# Patient Record
Sex: FEMALE | Race: WHITE | NOT HISPANIC OR LATINO | Employment: OTHER | URBAN - METROPOLITAN AREA
[De-identification: names, ages, dates, MRNs, and addresses within clinical notes are randomized per-mention and may not be internally consistent; named-entity substitution may affect disease eponyms.]

---

## 2021-03-17 ENCOUNTER — PREP FOR SURGERY (OUTPATIENT)
Dept: OTHER | Facility: HOSPITAL | Age: 71
End: 2021-03-17

## 2021-03-17 DIAGNOSIS — Z86.010 HISTORY OF COLON POLYPS: Primary | ICD-10-CM

## 2021-03-18 PROBLEM — Z86.0100 HISTORY OF COLON POLYPS: Status: ACTIVE | Noted: 2021-03-18

## 2021-03-18 PROBLEM — Z86.010 HISTORY OF COLON POLYPS: Status: ACTIVE | Noted: 2021-03-18

## 2021-06-22 PROBLEM — E78.5 HYPERLIPIDEMIA: Status: ACTIVE | Noted: 2021-06-22

## 2021-06-22 PROBLEM — I10 HYPERTENSION: Status: ACTIVE | Noted: 2021-06-22

## 2021-06-22 RX ORDER — LISINOPRIL AND HYDROCHLOROTHIAZIDE 20; 12.5 MG/1; MG/1
1 TABLET ORAL DAILY
COMMUNITY
Start: 2021-01-15

## 2021-06-22 RX ORDER — DIPHENOXYLATE HYDROCHLORIDE AND ATROPINE SULFATE 2.5; .025 MG/1; MG/1
1 TABLET ORAL DAILY
COMMUNITY

## 2021-06-22 RX ORDER — CHLORAL HYDRATE 500 MG
CAPSULE ORAL
Status: ON HOLD | COMMUNITY
End: 2021-06-23

## 2021-06-22 RX ORDER — SODIUM PHOSPHATE,MONO-DIBASIC 19G-7G/118
ENEMA (ML) RECTAL
COMMUNITY

## 2021-06-23 ENCOUNTER — HOSPITAL ENCOUNTER (OUTPATIENT)
Facility: HOSPITAL | Age: 71
Setting detail: HOSPITAL OUTPATIENT SURGERY
Discharge: HOME | End: 2021-06-23
Attending: COLON & RECTAL SURGERY | Admitting: COLON & RECTAL SURGERY

## 2021-06-23 ENCOUNTER — ANESTHESIA EVENT (OUTPATIENT)
Dept: GASTROENTEROLOGY | Facility: HOSPITAL | Age: 71
End: 2021-06-23

## 2021-06-23 ENCOUNTER — ANESTHESIA (OUTPATIENT)
Dept: GASTROENTEROLOGY | Facility: HOSPITAL | Age: 71
End: 2021-06-23

## 2021-06-23 VITALS
DIASTOLIC BLOOD PRESSURE: 54 MMHG | BODY MASS INDEX: 26.63 KG/M2 | HEIGHT: 62 IN | TEMPERATURE: 97.8 F | RESPIRATION RATE: 16 BRPM | WEIGHT: 144.7 LBS | HEART RATE: 68 BPM | SYSTOLIC BLOOD PRESSURE: 104 MMHG | OXYGEN SATURATION: 98 %

## 2021-06-23 DIAGNOSIS — Z86.010 HISTORY OF COLON POLYPS: ICD-10-CM

## 2021-06-23 PROCEDURE — 25010000002 PROPOFOL 10 MG/ML EMULSION: Performed by: NURSE ANESTHETIST, CERTIFIED REGISTERED

## 2021-06-23 PROCEDURE — 45380 COLONOSCOPY AND BIOPSY: CPT | Performed by: COLON & RECTAL SURGERY

## 2021-06-23 PROCEDURE — 25010000002 PHENYLEPHRINE PER 1 ML: Performed by: NURSE ANESTHETIST, CERTIFIED REGISTERED

## 2021-06-23 PROCEDURE — 88305 TISSUE EXAM BY PATHOLOGIST: CPT | Performed by: COLON & RECTAL SURGERY

## 2021-06-23 RX ORDER — SODIUM CHLORIDE, SODIUM LACTATE, POTASSIUM CHLORIDE, CALCIUM CHLORIDE 600; 310; 30; 20 MG/100ML; MG/100ML; MG/100ML; MG/100ML
1000 INJECTION, SOLUTION INTRAVENOUS CONTINUOUS
Status: DISCONTINUED | OUTPATIENT
Start: 2021-06-23 | End: 2021-06-23 | Stop reason: HOSPADM

## 2021-06-23 RX ORDER — EPHEDRINE SULFATE 50 MG/ML
INJECTION, SOLUTION INTRAVENOUS AS NEEDED
Status: DISCONTINUED | OUTPATIENT
Start: 2021-06-23 | End: 2021-06-23 | Stop reason: SURG

## 2021-06-23 RX ORDER — SODIUM CHLORIDE 0.9 % (FLUSH) 0.9 %
10 SYRINGE (ML) INJECTION AS NEEDED
Status: DISCONTINUED | OUTPATIENT
Start: 2021-06-23 | End: 2021-06-23 | Stop reason: HOSPADM

## 2021-06-23 RX ORDER — LIDOCAINE HYDROCHLORIDE 10 MG/ML
0.5 INJECTION, SOLUTION INFILTRATION; PERINEURAL ONCE AS NEEDED
Status: DISCONTINUED | OUTPATIENT
Start: 2021-06-23 | End: 2021-06-23 | Stop reason: HOSPADM

## 2021-06-23 RX ORDER — PROPOFOL 10 MG/ML
VIAL (ML) INTRAVENOUS AS NEEDED
Status: DISCONTINUED | OUTPATIENT
Start: 2021-06-23 | End: 2021-06-23 | Stop reason: SURG

## 2021-06-23 RX ORDER — PROPOFOL 10 MG/ML
VIAL (ML) INTRAVENOUS CONTINUOUS PRN
Status: DISCONTINUED | OUTPATIENT
Start: 2021-06-23 | End: 2021-06-23 | Stop reason: SURG

## 2021-06-23 RX ORDER — LIDOCAINE HYDROCHLORIDE 20 MG/ML
INJECTION, SOLUTION INFILTRATION; PERINEURAL AS NEEDED
Status: DISCONTINUED | OUTPATIENT
Start: 2021-06-23 | End: 2021-06-23 | Stop reason: SURG

## 2021-06-23 RX ADMIN — EPHEDRINE SULFATE 10 MG: 50 INJECTION INTRAVENOUS at 11:02

## 2021-06-23 RX ADMIN — PHENYLEPHRINE HYDROCHLORIDE 100 MCG: 10 INJECTION INTRAVENOUS at 10:42

## 2021-06-23 RX ADMIN — PROPOFOL 180 MCG/KG/MIN: 10 INJECTION, EMULSION INTRAVENOUS at 10:36

## 2021-06-23 RX ADMIN — LIDOCAINE HYDROCHLORIDE 50 MG: 20 INJECTION, SOLUTION INFILTRATION; PERINEURAL at 10:36

## 2021-06-23 RX ADMIN — PROPOFOL 100 MG: 10 INJECTION, EMULSION INTRAVENOUS at 10:36

## 2021-06-23 RX ADMIN — SODIUM CHLORIDE, POTASSIUM CHLORIDE, SODIUM LACTATE AND CALCIUM CHLORIDE 1000 ML: 600; 310; 30; 20 INJECTION, SOLUTION INTRAVENOUS at 10:24

## 2021-06-23 NOTE — DISCHARGE INSTRUCTIONS
For the next 24 hours patient needs to be with a responsible adult.    For 24 hours DO NOT drive, operate machinery, appliances, drink alcohol, make important decisions or sign legal documents.    Start with a light or bland diet if you are feeling sick to your stomach otherwise advance to regular diet as tolerated.    Follow recommendations on procedure report if provided by your doctor.    Call Dr ABBOTT for problems [phone]    Problems may include but not limited to: large amounts of bleeding, trouble breathing, repeated vomiting, severe unrelieved pain, fever or chills.

## 2021-06-23 NOTE — ANESTHESIA POSTPROCEDURE EVALUATION
"Patient: Cristine Puri    Procedure Summary     Date: 06/23/21 Room / Location: [facility] ENDOSCOPY 9 /  [facility]    Anesthesia Start: 1032 Anesthesia Stop: 1105    Procedure: COLONOSCOPY to cecum with biopsy / polypectomy (N/A ) Diagnosis:       History of colon polyps      (History of colon polyps [Z86.010])    Surgeons: Jessica Ramirez MD Provider: Chip Montoya MD    Anesthesia Type: MAC ASA Status: 2          Anesthesia Type: MAC    Vitals  Vitals Value Taken Time   /54 06/23/21 1118   Temp     Pulse 68 06/23/21 1118   Resp 16 06/23/21 1118   SpO2 98 % 06/23/21 1118           Post Anesthesia Care and Evaluation    Patient location during evaluation: bedside  Patient participation: complete - patient participated  Level of consciousness: sleepy but conscious  Pain score: 0  Pain management: adequate  Airway patency: patent  Anesthetic complications: No anesthetic complications    Cardiovascular status: acceptable  Respiratory status: acceptable  Hydration status: acceptable    Comments: /54 (BP Location: Left arm, Patient Position: Lying)   Pulse 68   Temp 36.6 °C (97.8 °F) (Oral)   Resp 16   Ht 157.5 cm (62\")   Wt 65.6 kg (144 lb 11.2 oz)   SpO2 98%   BMI 26.47 kg/m²         "

## 2021-06-23 NOTE — ANESTHESIA PREPROCEDURE EVALUATION
Anesthesia Evaluation     Patient summary reviewed and Nursing notes reviewed   NPO Solid Status: > 8 hours  NPO Liquid Status: > 8 hours           Airway   Mallampati: II  Neck ROM: full  No difficulty expected  Dental      Comment: crowns    Pulmonary     breath sounds clear to auscultation  Cardiovascular     Rhythm: regular    (+) hypertension, hyperlipidemia,       Neuro/Psych  GI/Hepatic/Renal/Endo      Musculoskeletal     Abdominal    Substance History      OB/GYN          Other                        Anesthesia Plan    ASA 2     MAC     intravenous induction     Anesthetic plan, all risks, benefits, and alternatives have been provided, discussed and informed consent has been obtained with: patient.

## 2021-06-24 LAB
CYTO UR: NORMAL
LAB AP CASE REPORT: NORMAL
LAB AP CLINICAL INFORMATION: NORMAL
PATH REPORT.FINAL DX SPEC: NORMAL
PATH REPORT.GROSS SPEC: NORMAL

## 2021-06-28 NOTE — H&P
Cristine Puri is a 71 y.o. female  who is referred by Vicky Abbott MD for a colonoscopy. She   has an indications: previous adenomatous polyp.     She denies any change in bowel function, melena, or hematochezia.    Past Medical History:   Diagnosis Date   • Alopecia 2016   • Colon polyps     FOLLOWED BY DR. VICKY ABBOTT   • Diverticulosis of large intestine    • Hyperlipidemia 2021   • Hypertension 2021   • Hypothyroidism    • Impaired fasting glucose 2013   • Irregular heart rate 2017   • Onychomycosis 2018   • Osteopenia 10/12/2016   • SAB (spontaneous )      A1   • Vitamin D deficiency    • White coat hypertension 2014       Past Surgical History:   Procedure Laterality Date   • COLONOSCOPY N/A 2021    5 MM TUBULAR ADENOMA POLYP IN CECUM, MULTIPLE SMALL AND LARGE DIVERTICULA IN SIGMOID, RESCOPE IN 5 YRS, DR. VICKY ABBOTT AT [facility]   • COLONOSCOPY W/ POLYPECTOMY N/A 2012    15 MM TUBULOVILLOUS ADENOMA POLYP IN PROXIMAL ASCENDING, DIVERTICULOSIS IN ENTIRE COLON, RESCOPE IN 3 YRS, DR. VICKY ABBOTT AT [facility]   • COLONOSCOPY W/ POLYPECTOMY N/A 2015    5 MM TUBULAR ADENOMA POLYP IN DESCENDING, MULTIPLE SMALL AND LARGE DIVERTICULA IN ENTIRE COLON, RESCOPE IN 5 YRS, DR. VICKY ABBOTT AT [facility]   • DILATATION AND CURETTAGE N/A     FOR SAB, DONE AT [facility]   • VAGINAL DELIVERY N/A 1974    AT [facility]   • VAGINAL DELIVERY N/A 1978    AT [facility]       No medications prior to admission.       No Known Allergies    Family History   Problem Relation Age of Onset   • Arthritis Mother    • Heart disease Mother    • Diabetes Mother    • Stroke Mother    • Hypertension Mother    • Hypertension Father    • Heart disease Father    • Diabetes Father    • Cancer Father         ABDOMINAL MASS   • Dementia Father    • Cancer Brother    • Lymphoma Brother    • Diabetes Brother        Social History     Socioeconomic History   • Marital status:      Spouse name: Not on file    • Number of children: Not on file   • Years of education: Not on file   • Highest education level: Not on file   Tobacco Use   • Smoking status: Never Smoker   • Smokeless tobacco: Never Used   Vaping Use   • Vaping Use: Never used   Substance and Sexual Activity   • Alcohol use: Never   • Drug use: Never   • Sexual activity: Defer     Birth control/protection: Post-menopausal       Review of Systems   Gastrointestinal: Negative for abdominal pain, nausea and vomiting.   All other systems reviewed and are negative.      Vitals:    06/23/21 1118   BP: 104/54   Pulse: 68   Resp: 16   Temp:    SpO2: 98%         Physical Exam  Constitutional:       Appearance: She is well-developed.   HENT:      Head: Normocephalic and atraumatic.   Eyes:      Pupils: Pupils are equal, round, and reactive to light.   Cardiovascular:      Rate and Rhythm: Regular rhythm.   Pulmonary:      Effort: Pulmonary effort is normal.   Abdominal:      General: There is no distension.      Palpations: Abdomen is soft.   Musculoskeletal:         General: Normal range of motion.   Skin:     General: Skin is warm and dry.   Neurological:      Mental Status: She is alert and oriented to person, place, and time.   Psychiatric:         Thought Content: Thought content normal.         Judgment: Judgment normal.           Assessment/Plan      indications: previous adenomatous polyp         I recommend colonoscopy.  I described risks, benefits of the procedure with the patient including but not limited to bleeding, infection, possibility of perforation and possible polypectomy. All of the patient's questions were answered and they would like to proceed with the above recommendations.

## 2024-11-05 ENCOUNTER — OFFICE VISIT (OUTPATIENT)
Dept: SURGERY | Facility: CLINIC | Age: 74
End: 2024-11-05
Payer: MEDICARE

## 2024-11-05 VITALS
HEART RATE: 97 BPM | SYSTOLIC BLOOD PRESSURE: 170 MMHG | DIASTOLIC BLOOD PRESSURE: 96 MMHG | BODY MASS INDEX: 26.75 KG/M2 | HEIGHT: 61 IN | WEIGHT: 141.7 LBS | OXYGEN SATURATION: 99 %

## 2024-11-05 DIAGNOSIS — R19.5 OCCULT BLOOD POSITIVE STOOL: Primary | ICD-10-CM

## 2024-11-05 PROBLEM — K63.5 COLON POLYP: Status: ACTIVE | Noted: 2021-05-26

## 2024-11-05 PROBLEM — I82.402 LEG DVT (DEEP VENOUS THROMBOEMBOLISM), ACUTE, LEFT: Status: ACTIVE | Noted: 2024-10-03

## 2024-11-05 PROCEDURE — 3080F DIAST BP >= 90 MM HG: CPT | Performed by: PHYSICIAN ASSISTANT

## 2024-11-05 PROCEDURE — 99204 OFFICE O/P NEW MOD 45 MIN: CPT | Performed by: PHYSICIAN ASSISTANT

## 2024-11-05 PROCEDURE — 3077F SYST BP >= 140 MM HG: CPT | Performed by: PHYSICIAN ASSISTANT

## 2024-11-05 PROCEDURE — 1160F RVW MEDS BY RX/DR IN RCRD: CPT | Performed by: PHYSICIAN ASSISTANT

## 2024-11-05 PROCEDURE — 1159F MED LIST DOCD IN RCRD: CPT | Performed by: PHYSICIAN ASSISTANT

## 2024-11-05 NOTE — PROGRESS NOTES
History of Present Illness  The patient is a 74-year-old female who presents as a new patient for evaluation of positive fecal occult blood.    Approximately a month ago, she experienced a blood clot in her leg, which is being treated with blood thinners. During a follow-up with her primary care physician, blood was detected in her stool. She has not observed any bleeding during bowel movements or when wiping, nor has she noticed any changes in her bowel habits.     Her last colonoscopy was performed in . During her recent hospital stay at [place], it was discovered that her hemoglobin levels were low. She reports no abdominal pain or unintentional weight loss. Her bowel movements are regular, occurring daily without any straining. She uses Metamucil to maintain regularity.       Past Medical History:   Diagnosis Date    Alopecia 2016    Colon polyps     FOLLOWED BY DR. VICKY ABBOTT    Diverticulosis of large intestine     Hyperlipidemia 2021    Hypertension 2021    Hypothyroidism     Impaired fasting glucose 2013    Irregular heart rate 2017    Onychomycosis 2018    Osteopenia 10/12/2016    SAB (spontaneous )      A1    Vitamin D deficiency     White coat hypertension 2014       Past Surgical History:   Procedure Laterality Date    COLONOSCOPY N/A 2021    5 MM TUBULAR ADENOMA POLYP IN CECUM, MULTIPLE SMALL AND LARGE DIVERTICULA IN SIGMOID, RESCOPE IN 5 YRS, DR. VICKY ABBOTT AT [facility]    COLONOSCOPY W/ POLYPECTOMY N/A 2012    15 MM TUBULOVILLOUS ADENOMA POLYP IN PROXIMAL ASCENDING, DIVERTICULOSIS IN ENTIRE COLON, RESCOPE IN 3 YRS, DR. VICKY ABBOTT AT [facility]    COLONOSCOPY W/ POLYPECTOMY N/A 2015    5 MM TUBULAR ADENOMA POLYP IN DESCENDING, MULTIPLE SMALL AND LARGE DIVERTICULA IN ENTIRE COLON, RESCOPE IN 5 YRS, DR. VICKY ABBOTT AT [facility]    DILATATION AND CURETTAGE N/A     FOR SAB, DONE AT [facility]    VAGINAL DELIVERY N/A 1974    AT [facility]    VAGINAL DELIVERY  N/A 05/1978    AT [facility]       Social History:   reports that she has never smoked. She has never used smokeless tobacco. She reports that she does not drink alcohol and does not use drugs.      Marriage status:     Family History   Problem Relation Age of Onset    Arthritis Mother     Heart disease Mother     Diabetes Mother     Stroke Mother     Hypertension Mother     Hypertension Father     Heart disease Father     Diabetes Father     Cancer Father         ABDOMINAL MASS    Dementia Father     Cancer Brother     Lymphoma Brother     Diabetes Brother          Current Outpatient Medications:     apixaban (ELIQUIS) 5 MG tablet tablet, Take 1 tablet by mouth Every 12 (Twelve) Hours., Disp: , Rfl:     CALCIUM-VITAMIN D PO, Take  by mouth., Disp: , Rfl:     glucosamine-chondroitin 500-400 MG capsule capsule, Take  by mouth., Disp: , Rfl:     lisinopril-hydrochlorothiazide (PRINZIDE,ZESTORETIC) 20-12.5 MG per tablet, Take 1 tablet by mouth Daily., Disp: , Rfl:     multivitamin (multivitamin) tablet tablet, Take 1 tablet by mouth Daily., Disp: , Rfl:     Allergy  Patient has no known allergies.    Vitals:    11/05/24 1304   BP: 170/96   Pulse: 97   SpO2: 99%   -  Body mass index is 26.77 kg/m².    Physical Exam  No acute distress     Results  Laboratory Studies  Hemoglobin was 12.7 on 10/27. Hemoglobin was 10.5 on 10/28.    Imaging  Colonoscopy performed on 6/23/2021 noted a 5 mm polyp removed from the cecum, sigmoid diverticulosis, otherwise normal.    Testing  Pathology report associated with the colonoscopy was consistent with a tubular adenoma.     Assessment & Plan  1.  Positive fecal occult blood.  The presence of blood in her stool could be attributed to hemorrhoids, diverticulosis, or another etiology.  She has not noticed any pain, bleeding, or changes in bowel movements. A colonoscopy will be scheduled to further investigate the cause of the hematochezia.    2. Anemia.  Her hemoglobin was noted to be low  at 10.5 on 10/28/2024. This could be related to the blood in her stool. A repeat colonoscopy will be performed to investigate the source of bleeding.       Patient or patient representative verbalized consent for the use of Ambient Listening during the visit with  Cyn Cuello PA-C for chart documentation. 11/5/2024  13:19 EST    Cyn Cuello PA-C  Physician Assistant  Colorectal Surgery

## 2024-11-06 ENCOUNTER — PATIENT ROUNDING (BHMG ONLY) (OUTPATIENT)
Dept: SURGERY | Facility: CLINIC | Age: 74
End: 2024-11-06
Payer: MEDICARE

## 2024-11-06 NOTE — PROGRESS NOTES
November 6, 2024    Hello, may I speak with Cristine Puri?    My name is Robinson      I am  with [facility]  [address] [facility] [address]  [facility] [address] [phone].    Before we get started may I verify your date of birth? 1950    I am calling to officially welcome you to our practice and ask about your recent visit. Is this a good time to talk? yes    Tell me about your visit with us. What things went well?  everything went well.        We're always looking for ways to make our patients' experiences even better. Do you have recommendations on ways we may improve?  no    Overall were you satisfied with your first visit to our practice? yes       I appreciate you taking the time to speak with me today. Is there anything else I can do for you? no      Thank you, and have a great day.

## 2024-11-22 ENCOUNTER — ANESTHESIA EVENT (OUTPATIENT)
Dept: GASTROENTEROLOGY | Facility: HOSPITAL | Age: 74
End: 2024-11-22
Payer: MEDICARE

## 2024-11-22 ENCOUNTER — ANESTHESIA (OUTPATIENT)
Dept: GASTROENTEROLOGY | Facility: HOSPITAL | Age: 74
End: 2024-11-22
Payer: MEDICARE

## 2024-11-22 ENCOUNTER — HOSPITAL ENCOUNTER (OUTPATIENT)
Facility: HOSPITAL | Age: 74
Setting detail: HOSPITAL OUTPATIENT SURGERY
Discharge: HOME | End: 2024-11-22
Attending: COLON & RECTAL SURGERY | Admitting: COLON & RECTAL SURGERY
Payer: MEDICARE

## 2024-11-22 VITALS
HEIGHT: 61 IN | DIASTOLIC BLOOD PRESSURE: 68 MMHG | WEIGHT: 140 LBS | RESPIRATION RATE: 16 BRPM | HEART RATE: 82 BPM | SYSTOLIC BLOOD PRESSURE: 106 MMHG | TEMPERATURE: 98.1 F | BODY MASS INDEX: 26.43 KG/M2 | OXYGEN SATURATION: 96 %

## 2024-11-22 PROCEDURE — 25010000002 GLYCOPYRROLATE 0.2 MG/ML SOLUTION: Performed by: NURSE ANESTHETIST, CERTIFIED REGISTERED

## 2024-11-22 PROCEDURE — 25010000002 PROPOFOL 10 MG/ML EMULSION: Performed by: NURSE ANESTHETIST, CERTIFIED REGISTERED

## 2024-11-22 RX ORDER — SODIUM CHLORIDE 0.9 % (FLUSH) 0.9 %
10 SYRINGE (ML) INJECTION AS NEEDED
Status: DISCONTINUED | OUTPATIENT
Start: 2024-11-22 | End: 2024-11-22 | Stop reason: HOSPADM

## 2024-11-22 RX ORDER — PROPOFOL 10 MG/ML
VIAL (ML) INTRAVENOUS AS NEEDED
Status: DISCONTINUED | OUTPATIENT
Start: 2024-11-22 | End: 2024-11-22 | Stop reason: SURG

## 2024-11-22 RX ORDER — GLYCOPYRROLATE 0.2 MG/ML
INJECTION INTRAMUSCULAR; INTRAVENOUS AS NEEDED
Status: DISCONTINUED | OUTPATIENT
Start: 2024-11-22 | End: 2024-11-22 | Stop reason: SURG

## 2024-11-22 RX ORDER — SODIUM CHLORIDE 0.9 % (FLUSH) 0.9 %
10 SYRINGE (ML) INJECTION EVERY 12 HOURS SCHEDULED
Status: DISCONTINUED | OUTPATIENT
Start: 2024-11-22 | End: 2024-11-22 | Stop reason: HOSPADM

## 2024-11-22 RX ORDER — SODIUM CHLORIDE 9 MG/ML
40 INJECTION, SOLUTION INTRAVENOUS AS NEEDED
Status: DISCONTINUED | OUTPATIENT
Start: 2024-11-22 | End: 2024-11-22 | Stop reason: HOSPADM

## 2024-11-22 RX ADMIN — PROPOFOL 100 MG: 10 INJECTION, EMULSION INTRAVENOUS at 12:09

## 2024-11-22 RX ADMIN — PROPOFOL 180 MCG/KG/MIN: 10 INJECTION, EMULSION INTRAVENOUS at 12:09

## 2024-11-22 RX ADMIN — GLYCOPYRROLATE 0.2 MG: 0.2 INJECTION INTRAMUSCULAR; INTRAVENOUS at 12:13

## 2024-11-22 NOTE — ANESTHESIA PREPROCEDURE EVALUATION
Anesthesia Evaluation     Patient summary reviewed and Nursing notes reviewed   NPO Solid Status: > 8 hours  NPO Liquid Status: > 8 hours           Airway   Mallampati: II  Neck ROM: full  No difficulty expected  Dental      Comment: crowns    Pulmonary     breath sounds clear to auscultation  Cardiovascular     Rhythm: regular    (+) hypertension, hyperlipidemia      Neuro/Psych  GI/Hepatic/Renal/Endo      Musculoskeletal     Abdominal    Substance History      OB/GYN          Other                          Anesthesia Plan    ASA 2     MAC     intravenous induction     Anesthetic plan, risks, benefits, and alternatives have been provided, discussed and informed consent has been obtained with: patient.

## 2024-11-22 NOTE — ANESTHESIA POSTPROCEDURE EVALUATION
"Patient: Cristine Puri    Procedure Summary       Date: 11/22/24 Room / Location:  [facility] /  [facility]    Anesthesia Start: 1206 Anesthesia Stop: 1226    Procedure: COLONOSCOPY to cecum Diagnosis:       Occult blood positive stool      (Occult blood positive stool [R19.5])    Surgeons: Jessica Ramirez MD Provider: Jesus Allison MD    Anesthesia Type: MAC ASA Status: 2            Anesthesia Type: MAC    Vitals  Vitals Value Taken Time   /68 11/22/24 1245   Temp     Pulse 85 11/22/24 1249   Resp 16 11/22/24 1244   SpO2 96 % 11/22/24 1246   Vitals shown include unfiled device data.        Post Anesthesia Care and Evaluation    Patient location during evaluation: bedside  Patient participation: complete - patient participated  Level of consciousness: awake and alert  Pain management: adequate    Airway patency: patent  Anesthetic complications: No anesthetic complications    Cardiovascular status: acceptable  Respiratory status: acceptable  Hydration status: acceptable    Comments: /68 (BP Location: Left arm, Patient Position: Sitting)   Pulse 82   Temp 36.7 °C (98.1 °F) (Oral)   Resp 16   Ht 154.9 cm (61\")   Wt 63.5 kg (140 lb)   LMP  (LMP Unknown)   SpO2 96%   BMI 26.45 kg/m²     "

## 2024-11-22 NOTE — H&P
Cristine Puri is a 74 y.o. female  who is referred by Vicky Abbott MD for a colonoscopy. She   has an indications: +FOBT.     She denies any change in bowel function, melena, or hematochezia.    Past Medical History:   Diagnosis Date    Alopecia 2016    Anemia     Blood in stool     Colon polyps     FOLLOWED BY DR. VICKY ABBOTT    Diverticulosis of large intestine     DVT (deep venous thrombosis)     LEFT LEG    History of transfusion 10/2024    1 UNIT NO REACTIONS    Hyperlipidemia 2021    Hypertension 2021    Hypothyroidism     Impaired fasting glucose 2013    Irregular heart rate 2017    Onychomycosis 2018    Osteopenia 10/12/2016    SAB (spontaneous )      A1    Vitamin D deficiency     White coat hypertension 2014       Past Surgical History:   Procedure Laterality Date    COLONOSCOPY N/A 2021    5 MM TUBULAR ADENOMA POLYP IN CECUM, MULTIPLE SMALL AND LARGE DIVERTICULA IN SIGMOID, RESCOPE IN 5 YRS, DR. VICKY ABBOTT AT [facility]    COLONOSCOPY W/ POLYPECTOMY N/A 2012    15 MM TUBULOVILLOUS ADENOMA POLYP IN PROXIMAL ASCENDING, DIVERTICULOSIS IN ENTIRE COLON, RESCOPE IN 3 YRS, DR. VICKY ABBOTT AT [facility]    COLONOSCOPY W/ POLYPECTOMY N/A 2015    5 MM TUBULAR ADENOMA POLYP IN DESCENDING, MULTIPLE SMALL AND LARGE DIVERTICULA IN ENTIRE COLON, RESCOPE IN 5 YRS, DR. VICKY ABBOTT AT [facility]    DILATATION AND CURETTAGE N/A     FOR SAB, DONE AT [facility]    VAGINAL DELIVERY N/A 1974    AT [facility]    VAGINAL DELIVERY N/A 1978    AT [facility]       Medications Prior to Admission   Medication Sig Dispense Refill Last Dose/Taking    apixaban (ELIQUIS) 5 MG tablet tablet Take 1 tablet by mouth Every 12 (Twelve) Hours.   Taking    CALCIUM-VITAMIN D PO Take  by mouth.   Taking    glucosamine-chondroitin 500-400 MG capsule capsule Take  by mouth.   Taking    lisinopril-hydrochlorothiazide (PRINZIDE,ZESTORETIC) 20-12.5 MG per tablet Take 1 tablet by mouth Daily.   Taking     multivitamin (multivitamin) tablet tablet Take 1 tablet by mouth Daily.   Taking       No Known Allergies    Family History   Problem Relation Age of Onset    Arthritis Mother     Heart disease Mother     Diabetes Mother     Stroke Mother     Hypertension Mother     Hypertension Father     Heart disease Father     Diabetes Father     Cancer Father         ABDOMINAL MASS    Dementia Father     Cancer Brother     Lymphoma Brother     Diabetes Brother     Malig Hyperthermia Neg Hx        Social History     Socioeconomic History    Marital status:    Tobacco Use    Smoking status: Never    Smokeless tobacco: Never   Vaping Use    Vaping status: Never Used   Substance and Sexual Activity    Alcohol use: Never    Drug use: Never    Sexual activity: Defer     Birth control/protection: Post-menopausal       ROS    There were no vitals filed for this visit.      Physical Exam  Constitutional:       Appearance: She is well-developed.   HENT:      Head: Normocephalic and atraumatic.   Eyes:      Pupils: Pupils are equal, round, and reactive to light.   Cardiovascular:      Rate and Rhythm: Regular rhythm.   Pulmonary:      Effort: Pulmonary effort is normal.   Abdominal:      General: There is no distension.      Palpations: Abdomen is soft.   Musculoskeletal:         General: Normal range of motion.   Skin:     General: Skin is warm and dry.   Neurological:      Mental Status: She is alert and oriented to person, place, and time.   Psychiatric:         Thought Content: Thought content normal.         Judgment: Judgment normal.           Assessment & Plan      indications: +FOBT         I recommend colonoscopy.  I described risks, benefits of the procedure with the patient including but not limited to bleeding, infection, possibility of perforation and possible polypectomy. All of the patient's questions were answered and they would like to proceed with the above recommendations.

## 2024-11-22 NOTE — DISCHARGE INSTRUCTIONS
For the next 24 hours patient needs to be with a responsible adult.    For 24 hours DO NOT drive, operate machinery, appliances, drink alcohol, make important decisions or sign legal documents.    Start with a light or bland diet if you are feeling sick to your stomach otherwise advance to regular diet as tolerated.    Follow recommendations on procedure report if provided by your doctor.    Call Dr koehler for problems [phone]    Problems may include but not limited to: large amounts of bleeding, trouble breathing, repeated vomiting, severe unrelieved pain, fever or chills.

## 2024-11-27 ENCOUNTER — TELEPHONE (OUTPATIENT)
Dept: SURGERY | Facility: CLINIC | Age: 74
End: 2024-11-27
Payer: MEDICARE

## 2024-11-27 NOTE — TELEPHONE ENCOUNTER
Sent message to on call Dr. Mendez:  DR. Alonzo asking for a call on patient that just left his office. Dr. Ramirez is out today. DRS. cell number is [phone].     Dr. Mendez replied:   I called back and spoke with the physician.

## 2024-12-05 ENCOUNTER — OFFICE VISIT (OUTPATIENT)
Dept: SURGERY | Facility: CLINIC | Age: 74
End: 2024-12-05
Payer: MEDICARE

## 2024-12-05 VITALS
OXYGEN SATURATION: 99 % | HEART RATE: 101 BPM | BODY MASS INDEX: 26.81 KG/M2 | DIASTOLIC BLOOD PRESSURE: 94 MMHG | SYSTOLIC BLOOD PRESSURE: 160 MMHG | WEIGHT: 142 LBS | HEIGHT: 61 IN

## 2024-12-05 DIAGNOSIS — D50.0 IRON DEFICIENCY ANEMIA DUE TO CHRONIC BLOOD LOSS: Primary | ICD-10-CM

## 2024-12-05 DIAGNOSIS — R19.5 OCCULT BLOOD POSITIVE STOOL: ICD-10-CM

## 2024-12-05 PROCEDURE — 99203 OFFICE O/P NEW LOW 30 MIN: CPT | Performed by: SURGERY

## 2024-12-05 PROCEDURE — 3077F SYST BP >= 140 MM HG: CPT | Performed by: SURGERY

## 2024-12-05 PROCEDURE — 1160F RVW MEDS BY RX/DR IN RCRD: CPT | Performed by: SURGERY

## 2024-12-05 PROCEDURE — 3080F DIAST BP >= 90 MM HG: CPT | Performed by: SURGERY

## 2024-12-05 PROCEDURE — 1159F MED LIST DOCD IN RCRD: CPT | Performed by: SURGERY

## 2024-12-05 RX ORDER — FERROUS GLUCONATE 324(38)MG
324 TABLET ORAL DAILY
COMMUNITY
Start: 2024-11-27

## 2024-12-05 NOTE — PROGRESS NOTES
Subjective   Cristine Puri is a 74 y.o. female who presents to the office in surgical consultation from Arianna Myrick APRN and Deo Montanez* for anemia.    History of Present Illness     The patient has been noted to have iron deficiency anemia and Hemoccult positive stools.  She was evaluated with a colonoscopy on 2024 that was normal with no explanation for the anemia.    She has not had any upper abdominal pain.  Her appetite is normal with no nausea or vomiting.  She does not have early satiety.  She does not have GERD.  She has not had an EGD.    Review of Systems   Constitutional:  Negative for fatigue and fever.   Respiratory:  Negative for chest tightness and shortness of breath.    Cardiovascular:  Negative for chest pain and palpitations.   Gastrointestinal:  Negative for abdominal pain, blood in stool, constipation, diarrhea, nausea and vomiting.     Past Medical History:   Diagnosis Date    Alopecia 2016    Anemia     Blood in stool     Colon polyps     FOLLOWED BY DR. VICKY ABBOTT    Diverticulosis of large intestine     DVT (deep venous thrombosis)     LEFT LEG    History of transfusion 10/2024    1 UNIT NO REACTIONS    Hyperlipidemia 2021    Hypertension 2021    Hypothyroidism     Impaired fasting glucose 2013    Irregular heart rate 2017    Onychomycosis 2018    Osteopenia 10/12/2016    SAB (spontaneous ) 1986     A1    Vitamin D deficiency     White coat hypertension 2014     Past Surgical History:   Procedure Laterality Date    COLONOSCOPY N/A 2021    5 MM TUBULAR ADENOMA POLYP IN CECUM, MULTIPLE SMALL AND LARGE DIVERTICULA IN SIGMOID, RESCOPE IN 5 YRS, DR. VICKY ABBOTT AT [facility]    COLONOSCOPY N/A 2024    DIVERTICULA IN ENTIRE COLON, RESCOPE IN 5 YRS, DR. VICKY ABBOTT AT [facility]    COLONOSCOPY W/ POLYPECTOMY N/A 2012    15 MM TUBULOVILLOUS ADENOMA POLYP IN PROXIMAL ASCENDING, DIVERTICULOSIS IN ENTIRE COLON,  RESCOPE IN 3 YRS, DR. VICKY ABBOTT AT [facility]    COLONOSCOPY W/ POLYPECTOMY N/A 12/31/2015    5 MM TUBULAR ADENOMA POLYP IN DESCENDING, MULTIPLE SMALL AND LARGE DIVERTICULA IN ENTIRE COLON, RESCOPE IN 5 YRS, DR. VICKY ABBOTT AT [facility]    DILATATION AND CURETTAGE N/A 1986    FOR SAB, DONE AT [facility]    VAGINAL DELIVERY N/A 02/1974    AT [facility]    VAGINAL DELIVERY N/A 05/1978    AT [facility]     Family History   Problem Relation Age of Onset    Arthritis Mother     Heart disease Mother     Diabetes Mother     Stroke Mother     Hypertension Mother     Hypertension Father     Heart disease Father     Diabetes Father     Cancer Father         ABDOMINAL MASS    Dementia Father     Cancer Brother     Lymphoma Brother     Diabetes Brother     Malig Hyperthermia Neg Hx      Social History     Socioeconomic History    Marital status:    Tobacco Use    Smoking status: Never    Smokeless tobacco: Never   Vaping Use    Vaping status: Never Used   Substance and Sexual Activity    Alcohol use: Never    Drug use: Never    Sexual activity: Defer     Birth control/protection: Post-menopausal       Objective   Physical Exam  Constitutional:       Appearance: Normal appearance. She is well-developed. She is not toxic-appearing.   Eyes:      General: No scleral icterus.  Pulmonary:      Effort: Pulmonary effort is normal. No respiratory distress.   Abdominal:      Palpations: Abdomen is soft.      Tenderness: There is no abdominal tenderness.   Skin:     General: Skin is warm and dry.   Neurological:      Mental Status: She is alert and oriented to person, place, and time.   Psychiatric:         Behavior: Behavior normal.         Thought Content: Thought content normal.         Judgment: Judgment normal.              Assessment & Plan     1.  Anemia: The patient has an iron deficiency anemia that is concerning for a GI source.  She had a colonoscopy that showed no abnormalities.  She will be scheduled for an EGD to complete endoscopic evaluation.   The patient understands the indications, alternatives, risks, and benefits of the procedure and wishes to proceed.

## 2024-12-05 NOTE — LETTER
December 5, 2024     Arianna Myrick APRN     Patient: Cristine Puri   YOB: 1950   Date of Visit: 12/5/2024     Dear AMBER Garcia:       Thank you for referring Cristine Puri to me for evaluation. Below are the relevant portions of my assessment and plan of care.    If you have questions, please do not hesitate to call me. I look forward to following Cristine along with you.         Sincerely,        Marin Mendez Jr., MD        CC: No Recipients    Marin Mendez Jr., MD  12/05/24 8113  Sign when Signing Visit  Subjective  Cristine Puri is a 74 y.o. female who presents to the office in surgical consultation from Arianna Myrick APRN and Deo Montanez* for anemia.    History of Present Illness     The patient has been noted to have iron deficiency anemia and Hemoccult positive stools.  She was evaluated with a colonoscopy on 11/22/2024 that was normal with no explanation for the anemia.    She has not had any upper abdominal pain.  Her appetite is normal with no nausea or vomiting.  She does not have early satiety.  She does not have GERD.  She has not had an EGD.    Review of Systems   Constitutional:  Negative for fatigue and fever.   Respiratory:  Negative for chest tightness and shortness of breath.    Cardiovascular:  Negative for chest pain and palpitations.   Gastrointestinal:  Negative for abdominal pain, blood in stool, constipation, diarrhea, nausea and vomiting.     Past Medical History:   Diagnosis Date   • Alopecia 09/2016   • Anemia    • Blood in stool    • Colon polyps     FOLLOWED BY DR. VICKY ABBOTT   • Diverticulosis of large intestine    • DVT (deep venous thrombosis)     LEFT LEG   • History of transfusion 10/2024    1 UNIT NO REACTIONS   • Hyperlipidemia 06/22/2021   • Hypertension 06/22/2021   • Hypothyroidism    • Impaired fasting glucose 09/17/2013   • Irregular heart rate 09/2017   • Onychomycosis 11/2018   • Osteopenia  10/12/2016   • SAB (spontaneous )      A1   • Vitamin D deficiency    • White coat hypertension 2014     Past Surgical History:   Procedure Laterality Date   • COLONOSCOPY N/A 2021    5 MM TUBULAR ADENOMA POLYP IN CECUM, MULTIPLE SMALL AND LARGE DIVERTICULA IN SIGMOID, RESCOPE IN 5 YRS, DR. VICKY ABBOTT AT [facility]   • COLONOSCOPY N/A 2024    DIVERTICULA IN ENTIRE COLON, RESCOPE IN 5 YRS, DR. VICKY ABBOTT AT [facility]   • COLONOSCOPY W/ POLYPECTOMY N/A 2012    15 MM TUBULOVILLOUS ADENOMA POLYP IN PROXIMAL ASCENDING, DIVERTICULOSIS IN ENTIRE COLON, RESCOPE IN 3 YRS, DR. VICKY ABBOTT AT [facility]   • COLONOSCOPY W/ POLYPECTOMY N/A 2015    5 MM TUBULAR ADENOMA POLYP IN DESCENDING, MULTIPLE SMALL AND LARGE DIVERTICULA IN ENTIRE COLON, RESCOPE IN 5 YRS, DR. VICKY ABBOTT AT [facility]   • DILATATION AND CURETTAGE N/A     FOR SAB, DONE AT [facility]   • VAGINAL DELIVERY N/A 1974    AT [facility]   • VAGINAL DELIVERY N/A 1978    AT [facility]     Family History   Problem Relation Age of Onset   • Arthritis Mother    • Heart disease Mother    • Diabetes Mother    • Stroke Mother    • Hypertension Mother    • Hypertension Father    • Heart disease Father    • Diabetes Father    • Cancer Father         ABDOMINAL MASS   • Dementia Father    • Cancer Brother    • Lymphoma Brother    • Diabetes Brother    • Malig Hyperthermia Neg Hx      Social History     Socioeconomic History   • Marital status:    Tobacco Use   • Smoking status: Never   • Smokeless tobacco: Never   Vaping Use   • Vaping status: Never Used   Substance and Sexual Activity   • Alcohol use: Never   • Drug use: Never   • Sexual activity: Defer     Birth control/protection: Post-menopausal       Objective  Physical Exam  Constitutional:       Appearance: Normal appearance. She is well-developed. She is not toxic-appearing.   Eyes:      General: No scleral icterus.  Pulmonary:      Effort: Pulmonary effort is normal. No respiratory distress.    Abdominal:      Palpations: Abdomen is soft.      Tenderness: There is no abdominal tenderness.   Skin:     General: Skin is warm and dry.   Neurological:      Mental Status: She is alert and oriented to person, place, and time.   Psychiatric:         Behavior: Behavior normal.         Thought Content: Thought content normal.         Judgment: Judgment normal.              Assessment & Plan    1.  Anemia: The patient has an iron deficiency anemia that is concerning for a GI source.  She had a colonoscopy that showed no abnormalities.  She will be scheduled for an EGD to complete endoscopic evaluation.  The patient understands the indications, alternatives, risks, and benefits of the procedure and wishes to proceed.

## 2024-12-05 NOTE — H&P (VIEW-ONLY)
Subjective   Cristine Puri is a 74 y.o. female who presents to the office in surgical consultation from Arianna Myrick APRN and Deo Montanez* for anemia.    History of Present Illness     The patient has been noted to have iron deficiency anemia and Hemoccult positive stools.  She was evaluated with a colonoscopy on 2024 that was normal with no explanation for the anemia.    She has not had any upper abdominal pain.  Her appetite is normal with no nausea or vomiting.  She does not have early satiety.  She does not have GERD.  She has not had an EGD.    Review of Systems   Constitutional:  Negative for fatigue and fever.   Respiratory:  Negative for chest tightness and shortness of breath.    Cardiovascular:  Negative for chest pain and palpitations.   Gastrointestinal:  Negative for abdominal pain, blood in stool, constipation, diarrhea, nausea and vomiting.     Past Medical History:   Diagnosis Date    Alopecia 2016    Anemia     Blood in stool     Colon polyps     FOLLOWED BY DR. VICKY ABBOTT    Diverticulosis of large intestine     DVT (deep venous thrombosis)     LEFT LEG    History of transfusion 10/2024    1 UNIT NO REACTIONS    Hyperlipidemia 2021    Hypertension 2021    Hypothyroidism     Impaired fasting glucose 2013    Irregular heart rate 2017    Onychomycosis 2018    Osteopenia 10/12/2016    SAB (spontaneous ) 1986     A1    Vitamin D deficiency     White coat hypertension 2014     Past Surgical History:   Procedure Laterality Date    COLONOSCOPY N/A 2021    5 MM TUBULAR ADENOMA POLYP IN CECUM, MULTIPLE SMALL AND LARGE DIVERTICULA IN SIGMOID, RESCOPE IN 5 YRS, DR. VICKY ABBOTT AT [facility]    COLONOSCOPY N/A 2024    DIVERTICULA IN ENTIRE COLON, RESCOPE IN 5 YRS, DR. VICKY ABBOTT AT [facility]    COLONOSCOPY W/ POLYPECTOMY N/A 2012    15 MM TUBULOVILLOUS ADENOMA POLYP IN PROXIMAL ASCENDING, DIVERTICULOSIS IN ENTIRE COLON,  RESCOPE IN 3 YRS, DR. VICKY ABBOTT AT [facility]    COLONOSCOPY W/ POLYPECTOMY N/A 12/31/2015    5 MM TUBULAR ADENOMA POLYP IN DESCENDING, MULTIPLE SMALL AND LARGE DIVERTICULA IN ENTIRE COLON, RESCOPE IN 5 YRS, DR. VICKY ABBOTT AT [facility]    DILATATION AND CURETTAGE N/A 1986    FOR SAB, DONE AT [facility]    VAGINAL DELIVERY N/A 02/1974    AT [facility]    VAGINAL DELIVERY N/A 05/1978    AT [facility]     Family History   Problem Relation Age of Onset    Arthritis Mother     Heart disease Mother     Diabetes Mother     Stroke Mother     Hypertension Mother     Hypertension Father     Heart disease Father     Diabetes Father     Cancer Father         ABDOMINAL MASS    Dementia Father     Cancer Brother     Lymphoma Brother     Diabetes Brother     Malig Hyperthermia Neg Hx      Social History     Socioeconomic History    Marital status:    Tobacco Use    Smoking status: Never    Smokeless tobacco: Never   Vaping Use    Vaping status: Never Used   Substance and Sexual Activity    Alcohol use: Never    Drug use: Never    Sexual activity: Defer     Birth control/protection: Post-menopausal       Objective   Physical Exam  Constitutional:       Appearance: Normal appearance. She is well-developed. She is not toxic-appearing.   Eyes:      General: No scleral icterus.  Pulmonary:      Effort: Pulmonary effort is normal. No respiratory distress.   Abdominal:      Palpations: Abdomen is soft.      Tenderness: There is no abdominal tenderness.   Skin:     General: Skin is warm and dry.   Neurological:      Mental Status: She is alert and oriented to person, place, and time.   Psychiatric:         Behavior: Behavior normal.         Thought Content: Thought content normal.         Judgment: Judgment normal.              Assessment & Plan     1.  Anemia: The patient has an iron deficiency anemia that is concerning for a GI source.  She had a colonoscopy that showed no abnormalities.  She will be scheduled for an EGD to complete endoscopic evaluation.   The patient understands the indications, alternatives, risks, and benefits of the procedure and wishes to proceed.

## 2024-12-26 ENCOUNTER — ANESTHESIA (OUTPATIENT)
Dept: GASTROENTEROLOGY | Facility: HOSPITAL | Age: 74
End: 2024-12-26
Payer: MEDICARE

## 2024-12-26 ENCOUNTER — HOSPITAL ENCOUNTER (OUTPATIENT)
Facility: HOSPITAL | Age: 74
Setting detail: HOSPITAL OUTPATIENT SURGERY
Discharge: HOME | End: 2024-12-26
Attending: SURGERY | Admitting: SURGERY
Payer: MEDICARE

## 2024-12-26 ENCOUNTER — ANESTHESIA EVENT (OUTPATIENT)
Dept: GASTROENTEROLOGY | Facility: HOSPITAL | Age: 74
End: 2024-12-26
Payer: MEDICARE

## 2024-12-26 VITALS
OXYGEN SATURATION: 98 % | SYSTOLIC BLOOD PRESSURE: 110 MMHG | WEIGHT: 141.9 LBS | BODY MASS INDEX: 26.79 KG/M2 | RESPIRATION RATE: 16 BRPM | HEART RATE: 74 BPM | DIASTOLIC BLOOD PRESSURE: 72 MMHG | HEIGHT: 61 IN

## 2024-12-26 PROCEDURE — 43235 EGD DIAGNOSTIC BRUSH WASH: CPT | Performed by: SURGERY

## 2024-12-26 PROCEDURE — 25010000002 PROPOFOL 10 MG/ML EMULSION: Performed by: NURSE ANESTHETIST, CERTIFIED REGISTERED

## 2024-12-26 PROCEDURE — 25810000003 LACTATED RINGERS PER 1000 ML: Performed by: SURGERY

## 2024-12-26 PROCEDURE — 25010000002 LIDOCAINE 2% SOLUTION: Performed by: NURSE ANESTHETIST, CERTIFIED REGISTERED

## 2024-12-26 RX ORDER — LIDOCAINE HYDROCHLORIDE 20 MG/ML
INJECTION, SOLUTION INFILTRATION; PERINEURAL AS NEEDED
Status: DISCONTINUED | OUTPATIENT
Start: 2024-12-26 | End: 2024-12-26 | Stop reason: SURG

## 2024-12-26 RX ORDER — PROPOFOL 10 MG/ML
VIAL (ML) INTRAVENOUS AS NEEDED
Status: DISCONTINUED | OUTPATIENT
Start: 2024-12-26 | End: 2024-12-26 | Stop reason: SURG

## 2024-12-26 RX ORDER — SODIUM CHLORIDE, SODIUM LACTATE, POTASSIUM CHLORIDE, CALCIUM CHLORIDE 600; 310; 30; 20 MG/100ML; MG/100ML; MG/100ML; MG/100ML
30 INJECTION, SOLUTION INTRAVENOUS CONTINUOUS PRN
Status: ACTIVE | OUTPATIENT
Start: 2024-12-26 | End: 2024-12-26

## 2024-12-26 RX ORDER — SODIUM CHLORIDE 9 MG/ML
INJECTION, SOLUTION INTRAVENOUS CONTINUOUS PRN
Status: DISCONTINUED | OUTPATIENT
Start: 2024-12-26 | End: 2024-12-26 | Stop reason: SURG

## 2024-12-26 RX ADMIN — SODIUM CHLORIDE, SODIUM LACTATE, POTASSIUM CHLORIDE, CALCIUM CHLORIDE 30 ML/HR: 20; 30; 600; 310 INJECTION, SOLUTION INTRAVENOUS at 08:57

## 2024-12-26 RX ADMIN — SODIUM CHLORIDE: 9 INJECTION, SOLUTION INTRAVENOUS at 09:59

## 2024-12-26 RX ADMIN — PROPOFOL 150 MCG/KG/MIN: 10 INJECTION, EMULSION INTRAVENOUS at 10:02

## 2024-12-26 RX ADMIN — LIDOCAINE HYDROCHLORIDE 100 MG: 20 INJECTION, SOLUTION INFILTRATION; PERINEURAL at 10:05

## 2024-12-26 RX ADMIN — PROPOFOL 100 MG: 10 INJECTION, EMULSION INTRAVENOUS at 10:05

## 2024-12-26 NOTE — ANESTHESIA PREPROCEDURE EVALUATION
Anesthesia Evaluation     no history of anesthetic complications:                Airway   Mallampati: II  TM distance: >3 FB  Neck ROM: full  Dental - normal exam     Comment: overbite    Pulmonary    (-) shortness of breath, recent URI, not a smoker  Cardiovascular     (+) hypertension, DVT, hyperlipidemia  (-) dysrhythmias, angina      Neuro/Psych  (-) dizziness/light headedness, syncope  GI/Hepatic/Renal/Endo    (+) GI bleeding , thyroid problem hypothyroidism    Musculoskeletal     Abdominal    Substance History      OB/GYN          Other                    Anesthesia Plan    ASA 2     MAC     intravenous induction     Anesthetic plan, risks, benefits, and alternatives have been provided, discussed and informed consent has been obtained with: patient.    CODE STATUS:

## 2024-12-26 NOTE — ANESTHESIA POSTPROCEDURE EVALUATION
Patient: Cristine Puri    Procedure Summary       Date: 12/26/24 Room / Location:  [facility]    Anesthesia Start: 0959 Anesthesia Stop: 1021    Procedure: ESOPHAGOGASTRODUODENOSCOPY (Esophagus) Diagnosis:       Iron deficiency anemia due to chronic blood loss      Occult blood positive stool      (Iron deficiency anemia due to chronic blood loss [D50.0])      (Occult blood positive stool [R19.5])    Surgeons: Marin Mendez Jr., MD Provider: Kelly Lima MD    Anesthesia Type: MAC ASA Status: 2            Anesthesia Type: MAC    Vitals  Vitals Value Taken Time   BP 90/37 12/26/24 1038   Temp     Pulse 65 12/26/24 1040   Resp 16 12/26/24 1038   SpO2 95 % 12/26/24 1040   Vitals shown include unfiled device data.        Post Anesthesia Care and Evaluation    Anesthetic complications: No anesthetic complications

## 2024-12-26 NOTE — OP NOTE
Surgeon:     Marin Mendez Jr., M.D.    Preoperative Diagnosis:     Anemia    Postoperative Diagnosis:     Hiatal hernia    Procedure Performed:     EGD    Indications:     The patient is a pleasant 74-year-old female with iron deficiency anemia.  She had a recent colonoscopy but has never had an EGD.  She presents for EGD for evaluation.  The patient understands the indications, alternatives, risks, and benefits of the procedure and wishes to proceed.    Procedure:     The patient was identified, taken to the endoscopy suite, and place in the left side down decubitus position.  The patient underwent a MAC anesthesia and was appropriately monitored through the case by the anesthesia personnel.  A biteblock was placed and the gastroscope was introduced into the oropharynx and advanced into the esophagus, through the esophagogastric junction, and into the stomach.  The gastroscope was then advanced through the pylorus into the duodenal bulb, and on to the second and third portion of the duodenum.  It was then withdrawn into the stomach.  All areas were carefully examined.  The stomach was decompressed and the scope was withdrawn.  The patient tolerated the procedure well.     Findings:    There was a hiatal hernia that was moderately large in size but had no associated esophagitis.  The stomach had a normal appearance with no gastritis or ulceration.  The duodenum had a normal appearance.    Recommendations:     1.  No source of anemia identified in the esophagus, stomach, or duodenum.    Marin Mendez Jr., M.D.

## 2024-12-26 NOTE — DISCHARGE INSTRUCTIONS
For the next 24 hours patient needs to be with a responsible adult.    For 24 hours DO NOT drive, operate machinery, appliances, drink alcohol, make important decisions or sign legal documents.    Start with a light or bland diet if you are feeling sick to your stomach otherwise advance to regular diet as tolerated.    Follow recommendations on procedure report if provided by your doctor.    Call Dr. Mendez for problems [phone]    Problems may include but not limited to: large amounts of bleeding, trouble breathing, repeated vomiting, severe unrelieved pain, fever or chills.

## (undated) DEVICE — CANN O2 ETCO2 FITS ALL CONN CO2 SMPL A/ 7IN DISP LF

## (undated) DEVICE — KT ORCA ORCAPOD DISP STRL

## (undated) DEVICE — BLCK/BITE BLOX W/DENTL/RIM W/STRAP 54F

## (undated) DEVICE — ADAPT CLN BIOGUARD AIR/H2O DISP

## (undated) DEVICE — SINGLE-USE BIOPSY FORCEPS: Brand: RADIAL JAW 4

## (undated) DEVICE — SENSR O2 OXIMAX FNGR A/ 18IN NONSTR

## (undated) DEVICE — TUBING, SUCTION, 1/4" X 10', STRAIGHT: Brand: MEDLINE

## (undated) DEVICE — LN SMPL CO2 SHTRM SD STREAM W/M LUER

## (undated) DEVICE — THE TORRENT IRRIGATION SCOPE CONNECTOR IS USED WITH THE TORRENT IRRIGATION TUBING TO PROVIDE IRRIGATION FLUIDS SUCH AS STERILE WATER DURING GASTROINTESTINAL ENDOSCOPIC PROCEDURES WHEN USED IN CONJUNCTION WITH AN IRRIGATION PUMP (OR ELECTROSURGICAL UNIT).: Brand: TORRENT

## (undated) DEVICE — GOWN SURG AERO CHROME XL